# Patient Record
Sex: MALE | Race: WHITE | NOT HISPANIC OR LATINO | Employment: UNEMPLOYED | URBAN - NONMETROPOLITAN AREA
[De-identification: names, ages, dates, MRNs, and addresses within clinical notes are randomized per-mention and may not be internally consistent; named-entity substitution may affect disease eponyms.]

---

## 2019-03-20 ENCOUNTER — APPOINTMENT (OUTPATIENT)
Dept: GENERAL RADIOLOGY | Facility: HOSPITAL | Age: 54
End: 2019-03-20

## 2019-03-20 ENCOUNTER — HOSPITAL ENCOUNTER (EMERGENCY)
Facility: HOSPITAL | Age: 54
Discharge: HOME | End: 2019-03-20
Admitting: EMERGENCY MEDICINE

## 2019-03-20 VITALS
BODY MASS INDEX: 27.17 KG/M2 | OXYGEN SATURATION: 98 % | DIASTOLIC BLOOD PRESSURE: 76 MMHG | HEART RATE: 52 BPM | TEMPERATURE: 98.2 F | WEIGHT: 205 LBS | SYSTOLIC BLOOD PRESSURE: 142 MMHG | HEIGHT: 73 IN | RESPIRATION RATE: 18 BRPM

## 2019-03-20 DIAGNOSIS — S83.412A SPRAIN OF MEDIAL COLLATERAL LIGAMENT OF LEFT KNEE, INITIAL ENCOUNTER: Primary | ICD-10-CM

## 2019-03-20 PROCEDURE — 73562 X-RAY EXAM OF KNEE 3: CPT

## 2019-03-20 PROCEDURE — 99283 EMERGENCY DEPT VISIT LOW MDM: CPT

## 2019-03-20 RX ORDER — LISINOPRIL 20 MG/1
20 TABLET ORAL DAILY
COMMUNITY

## 2019-03-20 RX ORDER — HYDROCODONE BITARTRATE AND ACETAMINOPHEN 7.5; 325 MG/1; MG/1
1 TABLET ORAL ONCE
Status: COMPLETED | OUTPATIENT
Start: 2019-03-20 | End: 2019-03-20

## 2019-03-20 RX ORDER — ETODOLAC 200 MG/1
200 CAPSULE ORAL EVERY 8 HOURS
Qty: 15 CAPSULE | Refills: 0 | Status: SHIPPED | OUTPATIENT
Start: 2019-03-20

## 2019-03-20 RX ORDER — ONDANSETRON 4 MG/1
4 TABLET, ORALLY DISINTEGRATING ORAL ONCE
Status: COMPLETED | OUTPATIENT
Start: 2019-03-20 | End: 2019-03-20

## 2019-03-20 RX ADMIN — ONDANSETRON 4 MG: 4 TABLET, ORALLY DISINTEGRATING ORAL at 13:40

## 2019-03-20 RX ADMIN — HYDROCODONE BITARTRATE AND ACETAMINOPHEN 1 TABLET: 7.5; 325 TABLET ORAL at 13:40

## 2019-03-20 NOTE — ED PROVIDER NOTES
Subjective   History of Present Illness  53-year-old male presents the chief complaint left knee pain.  Patient reports he was on top of a dumpster when he had jumped and had twisted his left knee upon landing.  He said he heard a pop sensation on the right medial knee.  There is tenderness to palpation.  Patient has no pain with flexion extension at the knee just pain with bearing weight.  No numbness or tingling or loss of strength or range of motion.  Review of Systems   All other systems reviewed and are negative.      Past Medical History:   Diagnosis Date   • Degenerative disc disease, lumbar    • Hypertension    • PFO (patent foramen ovale)        No Known Allergies    Past Surgical History:   Procedure Laterality Date   • TONSILLECTOMY         History reviewed. No pertinent family history.    Social History     Socioeconomic History   • Marital status:      Spouse name: Not on file   • Number of children: Not on file   • Years of education: Not on file   • Highest education level: Not on file   Tobacco Use   • Smoking status: Former Smoker   • Smokeless tobacco: Never Used   Substance and Sexual Activity   • Alcohol use: No     Frequency: Never   • Drug use: No           Objective   Physical Exam   Constitutional: He is oriented to person, place, and time. He appears well-developed and well-nourished.   HENT:   Head: Normocephalic and atraumatic.   Eyes: EOM are normal. Pupils are equal, round, and reactive to light.   Neck: Normal range of motion. Neck supple.   Cardiovascular: Normal rate and regular rhythm.   Pulmonary/Chest: Effort normal and breath sounds normal.   Abdominal: Soft. Bowel sounds are normal. He exhibits no distension. There is no tenderness.   Musculoskeletal: Normal range of motion.   Pain with varus stress test     +2 pedal pulse   Neurological: He is alert and oriented to person, place, and time. No cranial nerve deficit. Coordination normal.   Skin: Skin is warm and dry.  Capillary refill takes less than 2 seconds.   Psychiatric: He has a normal mood and affect. His behavior is normal.   Nursing note and vitals reviewed.      Procedures           ED Course          Labs Reviewed - No data to display  XR Knee 3 View Left   Final Result   Impression:    1. No acute osseous injury or malalignment. No effusion.           This report was finalized on 03/20/2019 14:26 by Dr Chele Goode, .                MDM  Number of Diagnoses or Management Options  Diagnosis management comments: Negative knee film, pain with varus stress test, possible MCL sprain, will dc in stable condition with knee immobilizer        Amount and/or Complexity of Data Reviewed  Tests in the radiology section of CPT®: reviewed and ordered    Risk of Complications, Morbidity, and/or Mortality  Presenting problems: moderate  Diagnostic procedures: moderate  Management options: moderate    Patient Progress  Patient progress: stable        Final diagnoses:   Sprain of medial collateral ligament of left knee, initial encounter            Zeferino Anderson PA-C  03/20/19 [id number]